# Patient Record
Sex: FEMALE | Race: OTHER | HISPANIC OR LATINO
[De-identification: names, ages, dates, MRNs, and addresses within clinical notes are randomized per-mention and may not be internally consistent; named-entity substitution may affect disease eponyms.]

---

## 2020-07-29 PROBLEM — Z00.00 ENCOUNTER FOR PREVENTIVE HEALTH EXAMINATION: Status: ACTIVE | Noted: 2020-07-29

## 2020-08-28 ENCOUNTER — APPOINTMENT (OUTPATIENT)
Dept: OBGYN | Facility: CLINIC | Age: 75
End: 2020-08-28

## 2020-10-26 ENCOUNTER — APPOINTMENT (OUTPATIENT)
Dept: UROLOGY | Facility: CLINIC | Age: 75
End: 2020-10-26
Payer: MEDICARE

## 2020-10-26 VITALS
WEIGHT: 150 LBS | HEIGHT: 55 IN | DIASTOLIC BLOOD PRESSURE: 69 MMHG | BODY MASS INDEX: 34.71 KG/M2 | TEMPERATURE: 97.2 F | SYSTOLIC BLOOD PRESSURE: 147 MMHG | HEART RATE: 86 BPM

## 2020-10-26 DIAGNOSIS — N95.2 POSTMENOPAUSAL ATROPHIC VAGINITIS: ICD-10-CM

## 2020-10-26 DIAGNOSIS — Z86.79 PERSONAL HISTORY OF OTHER DISEASES OF THE CIRCULATORY SYSTEM: ICD-10-CM

## 2020-10-26 PROCEDURE — 99204 OFFICE O/P NEW MOD 45 MIN: CPT

## 2020-10-26 RX ORDER — CLOTRIMAZOLE AND BETAMETHASONE DIPROPIONATE 10; .5 MG/G; MG/G
1-0.05 CREAM TOPICAL
Qty: 1 | Refills: 1 | Status: ACTIVE | COMMUNITY
Start: 2020-10-26 | End: 1900-01-01

## 2020-10-26 NOTE — REVIEW OF SYSTEMS
[see HPI] : see HPI [Urine Infection (bladder/kidney)] : bladder/kidney infection [Pain during urination] : pain during urination [Blood in urine that you can see] : blood visible in urine [Told you have blood in urine on a urine test] : told blood was present in a urine test [Wake up at night to urinate  How many times?  ___] : wakes up to urinate [unfilled] times during the night [Negative] : Heme/Lymph

## 2020-10-29 PROBLEM — Z86.79 HISTORY OF HYPERTENSION: Status: RESOLVED | Noted: 2020-10-26 | Resolved: 2020-10-29

## 2020-10-29 RX ORDER — AMLODIPINE BESYLATE 5 MG/1
5 TABLET ORAL
Qty: 90 | Refills: 0 | Status: ACTIVE | COMMUNITY
Start: 2020-07-06

## 2020-10-29 RX ORDER — DICLOFENAC SODIUM 10 MG/G
1 GEL TOPICAL
Qty: 500 | Refills: 0 | Status: COMPLETED | COMMUNITY
Start: 2020-10-11

## 2020-10-29 RX ORDER — ROSUVASTATIN CALCIUM 10 MG/1
10 TABLET, FILM COATED ORAL
Qty: 90 | Refills: 0 | Status: ACTIVE | COMMUNITY
Start: 2020-08-30

## 2020-10-29 RX ORDER — AMLODIPINE BESYLATE 2.5 MG/1
2.5 TABLET ORAL
Qty: 90 | Refills: 0 | Status: COMPLETED | COMMUNITY
Start: 2019-10-15

## 2020-10-29 RX ORDER — ESTRADIOL 0.1 MG/G
0.1 CREAM VAGINAL
Qty: 42 | Refills: 0 | Status: COMPLETED | COMMUNITY
Start: 2020-08-11

## 2020-10-29 RX ORDER — PREGABALIN 50 MG/1
50 CAPSULE ORAL
Qty: 30 | Refills: 0 | Status: COMPLETED | COMMUNITY
Start: 2020-09-16

## 2020-10-29 NOTE — ASSESSMENT
[FreeTextEntry1] : 74 yo F with vaginal itching, microhematuria\par \par - UA, culture\par - Try clotrimazole with betamethasone cream for vaginal atrophy\par - Discussed possible etiologies for microhematuria including benign (UTI, nephrolithiasis, cyst, BPH) vs malignancy (renal, ureteral and bladder). Discussed hematuria workup which includes CT urogram and cystoscopy. Discussed risk and benefits of cystoscopy.\par - Once microhematuria confirmed, will plan to proceed with workup

## 2020-10-29 NOTE — HISTORY OF PRESENT ILLNESS
[FreeTextEntry1] : 76 yo Monegasque speaking F presents with 6 month history of vaginal itching\par Noticed itching is exacerbated after urination\par no gross hematuria, no dysuria but was told of microhematuria by PCP\par Voiding 3/day, nocturia 4-5/night\par Drinks 3 bottles of water, 1 cup of coffee, 1 cup of tea at night\par constipation sometimes but usually normal\par 4 children, , LMP = 22 yrs ago\par no incontinence\par Tried estrace in August but made itching worse so stopped

## 2020-10-29 NOTE — PHYSICAL EXAM
[General Appearance - Well Developed] : well developed [General Appearance - Well Nourished] : well nourished [Normal Appearance] : normal appearance [Well Groomed] : well groomed [General Appearance - In No Acute Distress] : no acute distress [Edema] : no peripheral edema [Respiration, Rhythm And Depth] : normal respiratory rhythm and effort [Exaggerated Use Of Accessory Muscles For Inspiration] : no accessory muscle use [Abdomen Soft] : soft [Abdomen Tenderness] : non-tender [Costovertebral Angle Tenderness] : no ~M costovertebral angle tenderness [Urinary Bladder Findings] : the bladder was normal on palpation [Normal Station and Gait] : the gait and station were normal for the patient's age [] : no rash [No Focal Deficits] : no focal deficits [Oriented To Time, Place, And Person] : oriented to person, place, and time [Affect] : the affect was normal [Mood] : the mood was normal [Not Anxious] : not anxious [No Palpable Adenopathy] : no palpable adenopathy [Urethral Meatus] : normal urethra [External Female Genitalia] : normal external genitalia [FreeTextEntry1] : neg CST, no prolapse, vaginal atrophy

## 2020-11-17 LAB
APPEARANCE: CLEAR
BACTERIA UR CULT: NORMAL
BACTERIA: NEGATIVE
BILIRUBIN URINE: NEGATIVE
BLOOD URINE: NEGATIVE
COLOR: YELLOW
GLUCOSE QUALITATIVE U: NEGATIVE
HYALINE CASTS: 0 /LPF
KETONES URINE: NEGATIVE
LEUKOCYTE ESTERASE URINE: NEGATIVE
MICROSCOPIC-UA: NORMAL
NITRITE URINE: NEGATIVE
PH URINE: 6
PROTEIN URINE: NORMAL
RED BLOOD CELLS URINE: 3 /HPF
SPECIFIC GRAVITY URINE: 1.02
SQUAMOUS EPITHELIAL CELLS: 1 /HPF
UROBILINOGEN URINE: NORMAL
WHITE BLOOD CELLS URINE: 1 /HPF

## 2021-01-28 ENCOUNTER — APPOINTMENT (OUTPATIENT)
Age: 76
End: 2021-01-28
Payer: MEDICARE

## 2021-01-28 VITALS
WEIGHT: 147 LBS | HEART RATE: 82 BPM | TEMPERATURE: 97.1 F | HEIGHT: 55 IN | BODY MASS INDEX: 34.02 KG/M2 | OXYGEN SATURATION: 98 % | SYSTOLIC BLOOD PRESSURE: 168 MMHG | DIASTOLIC BLOOD PRESSURE: 68 MMHG

## 2021-01-28 PROCEDURE — 52000 CYSTOURETHROSCOPY: CPT

## 2021-01-28 PROCEDURE — 99072 ADDL SUPL MATRL&STAF TM PHE: CPT

## 2021-04-08 ENCOUNTER — APPOINTMENT (OUTPATIENT)
Dept: NEUROSURGERY | Facility: CLINIC | Age: 76
End: 2021-04-08

## 2021-04-09 ENCOUNTER — APPOINTMENT (OUTPATIENT)
Dept: NEUROSURGERY | Facility: CLINIC | Age: 76
End: 2021-04-09
Payer: MEDICARE

## 2021-04-09 VITALS
TEMPERATURE: 97.3 F | WEIGHT: 147 LBS | DIASTOLIC BLOOD PRESSURE: 72 MMHG | OXYGEN SATURATION: 99 % | HEIGHT: 55 IN | HEART RATE: 75 BPM | SYSTOLIC BLOOD PRESSURE: 138 MMHG | BODY MASS INDEX: 34.02 KG/M2

## 2021-04-09 DIAGNOSIS — E78.5 HYPERLIPIDEMIA, UNSPECIFIED: ICD-10-CM

## 2021-04-09 PROCEDURE — 99072 ADDL SUPL MATRL&STAF TM PHE: CPT

## 2021-04-09 PROCEDURE — 99203 OFFICE O/P NEW LOW 30 MIN: CPT

## 2021-04-12 NOTE — REASON FOR VISIT
[New Patient Visit] : a new patient visit [Family Member] : family member [FreeTextEntry1] : lower back pain

## 2021-04-12 NOTE — CONSULT LETTER
[Consult Letter:] : I had the pleasure of evaluating your patient, [unfilled]. [Please see my note below.] : Please see my note below. [Consult Closing:] : Thank you very much for allowing me to participate in the care of this patient.  If you have any questions, please do not hesitate to contact me. [Sincerely,] : Sincerely, [FreeTextEntry3] : LUTHER Phma

## 2021-04-12 NOTE — HISTORY OF PRESENT ILLNESS
[FreeTextEntry1] : lower erich kpain  [de-identified] : 75 years female here today for a neurosurgical evaluation of lower back pain. The patient present with 4 years of lower back pain with radiation to the right lower extremities.in which she managed conservatively with physical therapy,epidural injection and medications. She underwent surgery in 2018. After surgery she did well until recently she started  experiencing lower back pain with radiation to bilateral lower extremities intermittently. Pain is described aching and throbbing. Other associated symptoms include numbness, tingling and weakness. Pain is aggravated by walking, sitting, standing, exertional activities and bending.  For conservative management the patient has tried physical therapy and medications. The medication and physical therapy provide some relief of symptoms. No epidural injection after surgery. Denies urinary or bowel incontinence.

## 2021-04-12 NOTE — PHYSICAL EXAM
[General Appearance - Alert] : alert [General Appearance - In No Acute Distress] : in no acute distress [Oriented To Time, Place, And Person] : oriented to person, place, and time [Person] : oriented to person [Place] : oriented to place [Time] : oriented to time [Motor Tone] : muscle tone was normal in all four extremities [Motor Strength] : muscle strength was normal in all four extremities [Involuntary Movements] : no involuntary movements were seen [No Muscle Atrophy] : normal bulk in all four extremities [5] : S1 flexor hallucis longus 5/5 [Abnormal Walk] : normal gait [2+] : Ankle jerk left 2+ [Deformity] : no deformity [Erythema] : no erythema [Ecchymosis] : no ecchymosis [Swelling] : no swelling [Muscle Spasms, Bilateral] : bilateral muscle spasms [Restricted] : was restricted [Pain] : was painful [SLR] : positive Straight Leg Raise

## 2021-04-12 NOTE — ASSESSMENT
[FreeTextEntry1] : Tanya Ribeiro is a 75 years old female who presents to the office today for neurosurgical evaluation of lower back pain. The patient had a lumbar fusion approximately 2 years ago and did well after surgery. Recently she started to experience pain and started to managed the pain conservatively with use of NSAIDs and physical therapy but is not obtain complete relief of symptoms. Based on her clinical symptoms with failed conservative measure and past surgical history will order a new MRI of the lumbar spine to assess surgical site and rule out herniated disc and Xray to rule out instability of the spine. Once all imaging is complete patient to follow-up with Dr. Arguello. Plan of care and diagnostic testing has been discussed in details and all questions and concerns has been addressed and answered. I spent a total time of 30 minutes with the patient with more than half spent on counseling and coordinating care.

## 2021-05-26 PROBLEM — M54.16 LUMBAR RADICULOPATHY: Status: ACTIVE | Noted: 2021-04-09

## 2021-05-26 PROBLEM — M51.36 DISC DEGENERATION, LUMBAR: Status: ACTIVE | Noted: 2021-04-09

## 2021-05-27 ENCOUNTER — APPOINTMENT (OUTPATIENT)
Dept: NEUROSURGERY | Facility: CLINIC | Age: 76
End: 2021-05-27
Payer: MEDICARE

## 2021-05-27 VITALS
OXYGEN SATURATION: 97 % | BODY MASS INDEX: 34.02 KG/M2 | WEIGHT: 147 LBS | SYSTOLIC BLOOD PRESSURE: 145 MMHG | HEART RATE: 82 BPM | DIASTOLIC BLOOD PRESSURE: 74 MMHG | HEIGHT: 55 IN | TEMPERATURE: 97.3 F

## 2021-05-27 DIAGNOSIS — M51.36 OTHER INTERVERTEBRAL DISC DEGENERATION, LUMBAR REGION: ICD-10-CM

## 2021-05-27 DIAGNOSIS — M54.16 RADICULOPATHY, LUMBAR REGION: ICD-10-CM

## 2021-05-27 DIAGNOSIS — M54.9 DORSALGIA, UNSPECIFIED: ICD-10-CM

## 2021-05-27 PROCEDURE — 99213 OFFICE O/P EST LOW 20 MIN: CPT

## 2021-05-27 NOTE — HISTORY OF PRESENT ILLNESS
[FreeTextEntry1] : lower back pain  [de-identified] : 5/27/2021- The patient is here for follow-up of lower back pain and imaging. The patient states the pain has been stable since last visit states the pain has improved slightly. The pain remains intermittent with the lower back pain with radiation to bilateral lower extremities. The left lower extremities is the worst of the two. Other associated symptoms are numbness and tingling. She is using meloxicam for the pain. Denies incontinence and/or weakness. \par \par 4/9/2021 75 years female here today for a neurosurgical evaluation of lower back pain. The patient present with 4 years of lower back pain with radiation to the right lower extremities.in which she managed conservatively with physical therapy,epidural injection and medications. She underwent surgery in 2018. After surgery she did well until recently she started  experiencing lower back pain with radiation to bilateral lower extremities intermittently. Pain is described aching and throbbing. Other associated symptoms include numbness, tingling and weakness. Pain is aggravated by walking, sitting, standing, exertional activities and bending.  For conservative management the patient has tried physical therapy and medications. The medication and physical therapy provide some relief of symptoms. No epidural injection after surgery. Denies urinary or bowel incontinence.

## 2021-05-27 NOTE — ASSESSMENT
[FreeTextEntry1] : She has pain ant he MRI and CT shows adjacent level stenosis. She thinks she can tolerate the pain for now. I will have her do PT and get pain management for now.

## 2021-05-27 NOTE — PHYSICAL EXAM
[General Appearance - Alert] : alert [General Appearance - In No Acute Distress] : in no acute distress [Longitudinal] : longitudinal [Clean] : clean [Dry] : dry [Healing Well] : healing well [Well-Healed] : well-healed [Palpable Crepitus] : with palpable crepitus [Intact] : intact [No Drainage] : without drainage [Normal Skin] : normal [Normal Skin Turgor] : skin turgor was normal [Oriented To Time, Place, And Person] : oriented to person, place, and time [Motor Tone] : muscle tone was normal in all four extremities [Motor Strength] : muscle strength was normal in all four extremities [Involuntary Movements] : no involuntary movements were seen [No Muscle Atrophy] : normal bulk in all four extremities [Dysesthesia] : dysesthesia was present [Muscle Spasms, Bilateral] : bilateral muscle spasms [Restricted] : was restricted [Pain] : was painful [5] : 5/5 Ankle Plantar Flexion (S1) [2] : 2/4 Plantar Reflex [Tender] : not tender [Warm] : not warm [Indurated] : not indurated [FreeTextEntry1] : lumbar  [Deformity] : no deformity [Erythema] : no erythema [Ecchymosis] : no ecchymosis [Swelling] : no swelling [SLR] : negative Straight Leg Raise [Pain / Temp Decrease Inguinal Region (L1) - Right Side Only] : no L1 sensory impairment [Pain / Temp Decrease Upper Thigh (L2) - Right Only] : no L2 sensory impairment [Pain / Temp Decrease Lower Thigh (L3) - Right Only] : no L3 sensory impairment [Pain / Temp Decrease Knee And Medial Leg (L4) - Right Only] : no L4 sensory impairment [Pain / Temp Decrease Lateral Leg & Dorsum Of Foot Right Only] : no L5 sensory impairment [Pain / Temp Decrease Inguinal Region (L1) - Left Side Only] : no L1 sensory impairment [Pain / Temp Decrease Upper Thigh (L2) - Left Only] : no L2 sensory impairment [Pain / Temp Decrease Lower Thigh (L3) - Left Only] : no L3 sensory impairment [Pain / Temp Decrease Knee And Medial Leg (L4) - Left Only] : no L4 sensory impairment [Pain / Temp Decrease Lateral Leg & Dorsum Of Foot Left Only] : no L5 sensory impairment

## 2021-06-11 RX ORDER — IBUPROFEN 800 MG/1
800 TABLET, FILM COATED ORAL EVERY 8 HOURS
Qty: 90 | Refills: 2 | Status: ACTIVE | COMMUNITY
Start: 2021-06-11 | End: 1900-01-01

## 2023-05-15 ENCOUNTER — APPOINTMENT (OUTPATIENT)
Dept: GASTROENTEROLOGY | Facility: CLINIC | Age: 78
End: 2023-05-15
Payer: MEDICARE

## 2023-05-15 VITALS
HEIGHT: 55 IN | WEIGHT: 143 LBS | DIASTOLIC BLOOD PRESSURE: 76 MMHG | TEMPERATURE: 97.1 F | HEART RATE: 95 BPM | OXYGEN SATURATION: 98 % | SYSTOLIC BLOOD PRESSURE: 164 MMHG | BODY MASS INDEX: 33.09 KG/M2

## 2023-05-15 DIAGNOSIS — R10.13 EPIGASTRIC PAIN: ICD-10-CM

## 2023-05-15 DIAGNOSIS — Z12.11 ENCOUNTER FOR SCREENING FOR MALIGNANT NEOPLASM OF COLON: ICD-10-CM

## 2023-05-15 PROCEDURE — 99204 OFFICE O/P NEW MOD 45 MIN: CPT

## 2023-05-15 RX ORDER — VALSARTAN AND HYDROCHLOROTHIAZIDE 80; 12.5 MG/1; MG/1
80-12.5 TABLET, FILM COATED ORAL
Qty: 90 | Refills: 0 | Status: ACTIVE | COMMUNITY
Start: 2023-05-06

## 2023-05-15 RX ORDER — SIMETHICONE 125 MG/1
125 TABLET, CHEWABLE ORAL
Qty: 120 | Refills: 3 | Status: ACTIVE | COMMUNITY
Start: 2023-05-15 | End: 1900-01-01

## 2023-05-15 RX ORDER — ROSUVASTATIN CALCIUM 5 MG/1
5 TABLET, FILM COATED ORAL
Qty: 90 | Refills: 0 | Status: ACTIVE | COMMUNITY
Start: 2023-02-23

## 2023-05-15 RX ORDER — POLYETHYLENE GLYCOL 3350 AND ELECTROLYTES WITH LEMON FLAVOR 236; 22.74; 6.74; 5.86; 2.97 G/4L; G/4L; G/4L; G/4L; G/4L
236 POWDER, FOR SOLUTION ORAL
Qty: 1 | Refills: 0 | Status: ACTIVE | COMMUNITY
Start: 2023-05-15 | End: 1900-01-01

## 2023-05-15 NOTE — ASSESSMENT
[FreeTextEntry1] : Dyspepsia: The patient complains of dyspeptic symptoms.  The patient was advised to abide by an anti-gas (low FOD-MAP) diet.  The patient was given a pamphlet for anti-gas (low FOD-MAP).  The patient and I reviewed the anti-gas (low FOD-MAP) diet at length. The patient is to start on a trial of Simethicone one tablet 4 times a day p.r.n. abdominal pain and gas.\par Colon Cancer screening: I recommend colonoscopy for colon cancer screening over the age of 45 to assess for colonic polyps. The patient was told of the risks and benefits of the procedure.  The patient was told of the risks of perforation, emergency surgery, bleeding, infections and missed lesions. The patient is to be on a clear liquid diet the entire day prior to the procedure. The patient is to complete the entire prescribed bowel prep the day prior to the procedure as directed.  The patient is told not to drive, drink alcohol, use recreational drugs, exercise, or work the day of the procedure.  The patient was told of the need for an escort to accompany the patient home after the procedure. The patient is aware that the procedure may be cancelled if they fail to follow the directions.  The patient agreed and will schedule for the procedure. The patient can take the antihypertensive medication with a sip of water one hour prior to the procedure. The patient is to hold the diabetic medication the day before and the morning of the procedure. The patient is to be n.p.o. after midnight and bowel prep was given.  The patient is to return for the procedure.\par Colonoscopy: I recommend a colonoscopy to assess the symptoms and for colonic polyps.  The patient was told of the risks and benefits of the procedure.  The patient was told of the risks of perforation, emergency surgery, bleeding, infections and missed lesions.  The patient is to be on a clear liquid diet the entire day prior to the procedure. The patient is to complete the entire prescribed bowel prep the day prior to the procedure as directed.   The patient is told not to drive, drink alcohol, use recreational drugs, exercise, or work the day of the procedure.  The patient was told of the need for an escort to accompany the patient home after the procedure. The patient is aware that the procedure may be cancelled if they fail to follow the directions.  The patient agreed and will schedule for the procedure. The patient can take the antihypertensive medication with a sip of water one hour prior to the procedure.  The patient is to be n.p.o. after midnight and bowel prep was given.  The patient is to return for the procedure.\par Follow-up: The patient is to follow-up in the office in 4 weeks to reassess the symptoms. The patient was told to call the office if any further problems. \par

## 2023-05-15 NOTE — HISTORY OF PRESENT ILLNESS
[FreeTextEntry1] : The colonoscopy to the cecum performed at the office on February 27, 2015 revealed a very poor prep colonoscopy and small internal hemorrhoids. There were no polyps, masses, diverticulosis, AVMs or colitis noted.  [de-identified] : The CAT scan of the abdomen and pelvis with urogram and renal protocol performed on January 14, 2021 revealed no structural cause for hematuria and kidneys, collecting system or urinary bladder.  Also noted were circumferential fusion hardware noted at L4-S1, grade 1 anterolisthesis at L4-5 and L5-S1, minimal sigmoid diverticulosis without diverticulitis, benign bilobed cyst in the right lobe of the liver measuring up to 4.1 cm and status postcholecystectomy.

## 2023-05-15 NOTE — REVIEW OF SYSTEMS
[Bloating (gassiness)] : bloating [Arthralgias (joint pain)] : arthralgias [Hot Flashes] : hot flashes [Easy Bruising] : a tendency for easy bruising [Negative] : Heme/Lymph [FreeTextEntry3] : blurred vision [FreeTextEntry8] : nocturia, dysuria

## 2023-08-03 ENCOUNTER — RESULT REVIEW (OUTPATIENT)
Age: 78
End: 2023-08-03

## 2023-08-03 ENCOUNTER — APPOINTMENT (OUTPATIENT)
Dept: GASTROENTEROLOGY | Facility: HOSPITAL | Age: 78
End: 2023-08-03

## 2023-08-03 ENCOUNTER — OUTPATIENT (OUTPATIENT)
Dept: OUTPATIENT SERVICES | Facility: HOSPITAL | Age: 78
LOS: 1 days | End: 2023-08-03
Payer: COMMERCIAL

## 2023-08-03 ENCOUNTER — TRANSCRIPTION ENCOUNTER (OUTPATIENT)
Age: 78
End: 2023-08-03

## 2023-08-03 VITALS
DIASTOLIC BLOOD PRESSURE: 90 MMHG | HEIGHT: 55 IN | TEMPERATURE: 98 F | OXYGEN SATURATION: 96 % | HEART RATE: 76 BPM | SYSTOLIC BLOOD PRESSURE: 128 MMHG | WEIGHT: 143.08 LBS | RESPIRATION RATE: 16 BRPM

## 2023-08-03 VITALS
OXYGEN SATURATION: 98 % | RESPIRATION RATE: 16 BRPM | SYSTOLIC BLOOD PRESSURE: 123 MMHG | DIASTOLIC BLOOD PRESSURE: 68 MMHG | HEART RATE: 60 BPM

## 2023-08-03 DIAGNOSIS — Z90.49 ACQUIRED ABSENCE OF OTHER SPECIFIED PARTS OF DIGESTIVE TRACT: Chronic | ICD-10-CM

## 2023-08-03 DIAGNOSIS — Z12.4 ENCOUNTER FOR SCREENING FOR MALIGNANT NEOPLASM OF CERVIX: ICD-10-CM

## 2023-08-03 DIAGNOSIS — Z98.1 ARTHRODESIS STATUS: Chronic | ICD-10-CM

## 2023-08-03 DIAGNOSIS — Z98.51 TUBAL LIGATION STATUS: Chronic | ICD-10-CM

## 2023-08-03 PROCEDURE — 88305 TISSUE EXAM BY PATHOLOGIST: CPT | Mod: 26

## 2023-08-03 PROCEDURE — 45380 COLONOSCOPY AND BIOPSY: CPT | Mod: PT

## 2023-08-03 PROCEDURE — 45385 COLONOSCOPY W/LESION REMOVAL: CPT

## 2023-08-03 PROCEDURE — 88305 TISSUE EXAM BY PATHOLOGIST: CPT

## 2023-08-03 RX ORDER — SODIUM CHLORIDE 9 MG/ML
500 INJECTION INTRAMUSCULAR; INTRAVENOUS; SUBCUTANEOUS
Refills: 0 | Status: DISCONTINUED | OUTPATIENT
Start: 2023-08-03 | End: 2023-08-17

## 2023-08-03 NOTE — ASU PATIENT PROFILE, ADULT - NSICDXPASTSURGICALHX_GEN_ALL_CORE_FT
PAST SURGICAL HISTORY:  H/O spinal fusion     H/O tubal ligation     History of cholecystectomy

## 2023-08-03 NOTE — CHART NOTE - NSCHARTNOTEFT_GEN_A_CORE
History of Present Illness       The patient is a 77-year-old  female with past medical history significant for hypertension, hypercholesterolemia and diabetes mellitus who was referred to my office by Dr. Neto Norwood for dyspepsia. The patient also came to the office for evaluation for colon cancer screening. I was asked to render an opinion for consultation for the above complaints. The patient states that she is feeling fine. The patient denies any abdominal pain. The patient complains of abdominal gas and bloating. The patient denies any nausea or vomiting. The patient denies any gastroesophageal reflux disease or dysphagia. The patient denies any atypical chest pain, shortness of breath or palpitations. The patient admits to occasional episodes of diaphoresis. The patient denies any constipation or diarrhea. The patient has 1 bowel movement a day. The patient denies a change in bowel habits. The patient denies a change in caliber of stool. The patient denies having mucus discharge with the bowel movements. The patient denies any bright red blood per rectum, melena or hematemesis. The patient denies any rectal pain or rectal pruritus. The patient denies any weight loss or anorexia. She denies any fevers or chills. The patient denies any jaundice or pruritus. The patient complains of chronic lower back pain. The patient had a prior cholecystectomy. The patient denies having a recent upper endoscopy and colonoscopy performed by another gastroenterologist. The patient's last menstrual period was age 51. patient is a . The patient's first menstrual period was at age 16. The patient had a colonoscopy to the cecum performed at the office on 2015. The colonoscopic findings revealed a very poor prep colonoscopy and small internal hemorrhoids. There were no polyps, masses, diverticulosis, AVMs or colitis noted. The patient tolerated the procedure well. The CAT scan of the abdomen and pelvis with urogram and renal protocol performed on 2021 revealed no structural cause for hematuria and kidneys, collecting system or urinary bladder. Also noted were circumferential fusion hardware noted at L4-S1, grade 1 anterolisthesis at L4-5 and L5-S1, minimal sigmoid diverticulosis without diverticulitis, benign bilobed cyst in the right lobe of the liver measuring up to 4.1 cm and status postcholecystectomy.The patient admits to having a prior colonoscopy performed by another gastroenterologist. According to the patient, the colonoscopic findings revealed a normal study. The patient denies any significant family history of GI problems.  ?  ?  ?  (-) smoking, (-) ETOH, (-) IVDA  Gastroenterology Summary    Colonoscopy: The colonoscopy to the cecum performed at the office on 2015 revealed a very poor prep colonoscopy and small internal hemorrhoids. There were no polyps, masses, diverticulosis, AVMs or colitis noted.  Radiology Summary    CT: The CAT scan of the abdomen and pelvis with urogram and renal protocol performed on 2021 revealed no structural cause for hematuria and kidneys, collecting system or urinary bladder. Also noted were circumferential fusion hardware noted at L4-S1, grade 1 anterolisthesis at L4-5 and L5-S1, minimal sigmoid diverticulosis without diverticulitis, benign bilobed cyst in the right lobe of the liver measuring up to 4.1 cm and status postcholecystectomy.  ?  Active Problems  Disc degeneration, lumbar (722.52) (M51.36)  Lumbar radiculopathy (724.4) (M54.16)  Microhematuria (599.72) (R31.29)  Pain in back (724.5) (M54.9)  Vaginal atrophy (627.3) (N95.2)       ?  Past Medical History  History of hypertension (V12.59) (Z86.79)  History of Hyperlipidemia (272.4) (E78.5)       ?  Surgical History  History of Cholecystectomy  History of Lower back surgery  History of Posterior lumbar vertebral fusion       ?  Family History  Family history of Cancer  Family history of Diabetes       ?  Social History  Has 4 children    Never a smoker  No alcohol use  Retired from employment       ?  Current Meds  amLODIPine Besylate 5 MG Oral Tablet; TOME LIDIA TABLETA POR V A ORAL AT NIGHT  Clotrimazole-Betamethasone 1-0.05 % External Cream; APPLY  AND RUB  IN A THIN  FILM TO AFFECTED AREAS ONCE DAILY  Ibuprofen 800 MG Oral Tablet; TAKE 1 TABLET EVERY 8 HOURS AS NEEDED  Rosuvastatin Calcium 10 MG Oral Tablet; TOME LIDIA TABLETA TODOS LOS D AS  Rosuvastatin Calcium 5 MG Oral Tablet  Valsartan-hydroCHLOROthiazide 80-12.5 MG Oral Tablet       Allergies  No Known Drug Allergies       ?  Review of Systems          Eyes:. blurred vision.    Gastrointestinal: bloating.    Genitourinary:. nocturia, dysuria.    Musculoskeletal: arthralgias.    Endocrine: hot flashes.    Hematologic/Lymphatic: a tendency for easy bruising.    Constitutional, Eyes, ENT, Cardiovascular, Respiratory, Gastrointestinal, Genitourinary, Musculoskeletal, Integumentary, Neurological, Psychiatric, Endocrine and Heme/Lymph are otherwise negative.  ?  Vitals  Vital Signs  ?  Recorded: [id number] 09:15AM  Systolic  164, RUE, Sitting  Diastolic  76, RUE, Sitting  Height  4 ft 5 in  Weight  143 lb  BMI Calculated  35.79 kg/m2  BSA Calculated  1.48  Temperature  97.1 F, Temporal  Heart Rate  95  O2 Saturation  98       ?  Physical Exam   Physical Exam:  General Appearance: Well developed, overweight, no acute distress  ENT:  nose clear, ears unremarkable  Eyes: No enteric sclera, conjunctiva clear.  Neck: Supple, without masses  Respiratory: Breath sounds equal and bilateral, no wheezing no rales or rhonchi  Cardiovascular: S1-S2 audible, no murmur, no rubs or gallops  GI: (+) BS, soft, nontender, no rebound, no guarding, no masses  Liver: liver edge palpated  Rectal: not done  Musculo-skeletal: Good motor strength, good range of motion, normal appearing extremities  Skin: Normal appearing skin, no jaundice, no rashes or nodules  Neurological: without focal motor or sensory deficits Patient is moving all extremities spontaneously and to command with normal muscle strength alert and oriented X3  Psychiatric: Good affect, not depressed, not anxious  ?     ?  ?  Assessment  Dyspepsia (536.8) (R10.13)  Colon cancer screening (V76.51) (Z12.11)  Dyspepsia: The patient complains of dyspeptic symptoms. The patient was advised to abide by an anti-gas (low FOD-MAP) diet. The patient was given a pamphlet for anti-gas (low FOD-MAP). The patient and I reviewed the anti-gas (low FOD-MAP) diet at length. The patient is to start on a trial of Simethicone one tablet 4 times a day p.r.n. abdominal pain and gas.  ?  Colon Cancer screening: I recommend colonoscopy for colon cancer screening over the age of 45 to assess for colonic polyps. The patient was told of the risks and benefits of the procedure. The patient was told of the risks of perforation, emergency surgery, bleeding, infections and missed lesions. The patient is to be on a clear liquid diet the entire day prior to the procedure. The patient is to complete the entire prescribed bowel prep the day prior to the procedure as directed. The patient is told not to drive, drink alcohol, use recreational drugs, exercise, or work the day of the procedure. The patient was told of the need for an escort to accompany the patient home after the procedure. The patient is aware that the procedure may be cancelled if they fail to follow the directions. The patient agreed and will schedule for the procedure. The patient can take the antihypertensive medication with a sip of water one hour prior to the procedure. The patient is to hold the diabetic medication the day before and the morning of the procedure. The patient is to be n.p.o. after midnight and bowel prep was given. The patient is to return for the procedure.  ?  Colonoscopy: I recommend a colonoscopy to assess the symptoms and for colonic polyps. The patient was told of the risks and benefits of the procedure. The patient was told of the risks of perforation, emergency surgery, bleeding, infections and missed lesions. The patient is to be on a clear liquid diet the entire day prior to the procedure. The patient is to complete the entire prescribed bowel prep the day prior to the procedure as directed. The patient is told not to drive, drink alcohol, use recreational drugs, exercise, or work the day of the procedure. The patient was told of the need for an escort to accompany the patient home after the procedure. The patient is aware that the procedure may be cancelled if they fail to follow the directions. The patient agreed and will schedule for the procedure. The patient can take the antihypertensive medication with a sip of water one hour prior to the procedure. The patient is to be n.p.o. after midnight and bowel prep was given. The patient is to return for the procedure.  ?  Follow-up: The patient is to follow-up in the office in 4 weeks to reassess the symptoms. The patient was told to call the office if any further problems.             ?  Plan  Colon cancer screening  Start: PEG-3350/Electrolytes 236 GM Oral Solution Reconstituted; MIX AS DIRECTED  AND DRINK OVER 4 HOURS, START AT 4PM  Colonoscopy Screening; Status:Need Information - Financial Authorization; Requested  for:[id number];  Dyspepsia  Start: Simethicone 125 MG Oral Tablet Chewable; CHEW AND SWALLOW 1 TABLET 4  TIMES DAILY, AFTER MEALS AND AT BEDTIME

## 2023-08-03 NOTE — ASU PATIENT PROFILE, ADULT - FALL HARM RISK - UNIVERSAL INTERVENTIONS
Bed in lowest position, wheels locked, appropriate side rails in place/Call bell, personal items and telephone in reach/Instruct patient to call for assistance before getting out of bed or chair/Non-slip footwear when patient is out of bed/[place] to call system/Physically safe environment - no spills, clutter or unnecessary equipment/Purposeful Proactive Rounding/Room/bathroom lighting operational, light cord in reach

## 2023-08-03 NOTE — ASU PATIENT PROFILE, ADULT - REASON FOR ADMISSION, PROFILE
Addended by: Skylar Tiwari on: 3/20/2023 03:53 PM     Modules accepted: Orders screening colonoscopy

## 2023-08-07 LAB — SURGICAL PATHOLOGY STUDY: SIGNIFICANT CHANGE UP

## 2024-02-26 ENCOUNTER — APPOINTMENT (OUTPATIENT)
Dept: UROLOGY | Facility: CLINIC | Age: 79
End: 2024-02-26
Payer: MEDICARE

## 2024-02-26 VITALS
WEIGHT: 140 LBS | HEIGHT: 55 IN | TEMPERATURE: 97.8 F | BODY MASS INDEX: 32.4 KG/M2 | HEART RATE: 80 BPM | OXYGEN SATURATION: 98 % | SYSTOLIC BLOOD PRESSURE: 125 MMHG | DIASTOLIC BLOOD PRESSURE: 64 MMHG

## 2024-02-26 PROBLEM — I10 ESSENTIAL (PRIMARY) HYPERTENSION: Chronic | Status: ACTIVE | Noted: 2023-08-03

## 2024-02-26 PROBLEM — E78.5 HYPERLIPIDEMIA, UNSPECIFIED: Chronic | Status: ACTIVE | Noted: 2023-08-03

## 2024-02-26 PROCEDURE — 99203 OFFICE O/P NEW LOW 30 MIN: CPT

## 2024-03-03 NOTE — HISTORY OF PRESENT ILLNESS
[FreeTextEntry1] : 76 yo Welsh speaking F presents with 6 month history of vaginal itching Noticed itching is exacerbated after urination no gross hematuria, no dysuria but was told of microhematuria by PCP Voiding 3/day, nocturia 4-5/night Drinks 3 bottles of water, 1 cup of coffee, 1 cup of tea at night constipation sometimes but usually normal 4 children, , LMP = 22 yrs ago no incontinence Tried estrace in August but made itching worse so stopped  24 Interval history: Hasn't been seen since 2021 Referred for persistent microhematuria no dysuria no gross hematuria no UTI history 6-10 RBCs on UA in 24 [facility] - everything fine normal bowel movements

## 2024-03-03 NOTE — ASSESSMENT
[FreeTextEntry1] : 79 yo F with microhematuria  - Reviewed labwork done by PCP and confirmed persistent microhematuria - Discussed possible etiologies for microhematuria including benign (UTI, nephrolithiasis, cyst, BPH) vs malignancy (renal, ureteral and bladder). Discussed hematuria workup which includes upper tract imaging such as US or CT urogram and cystoscopy. Discussed risk and benefits of cystoscopy. - Schedule US and cysto - Urine culture

## 2024-03-04 ENCOUNTER — APPOINTMENT (OUTPATIENT)
Dept: UROLOGY | Facility: CLINIC | Age: 79
End: 2024-03-04
Payer: MEDICARE

## 2024-03-04 VITALS
BODY MASS INDEX: 32.4 KG/M2 | HEIGHT: 55 IN | TEMPERATURE: 97.3 F | DIASTOLIC BLOOD PRESSURE: 63 MMHG | OXYGEN SATURATION: 95 % | WEIGHT: 140 LBS | SYSTOLIC BLOOD PRESSURE: 123 MMHG | HEART RATE: 75 BPM

## 2024-03-04 DIAGNOSIS — R31.29 OTHER MICROSCOPIC HEMATURIA: ICD-10-CM

## 2024-03-04 LAB — BACTERIA UR CULT: NORMAL

## 2024-03-04 PROCEDURE — 76775 US EXAM ABDO BACK WALL LIM: CPT

## 2024-03-04 PROCEDURE — 52000 CYSTOURETHROSCOPY: CPT

## 2024-03-04 RX ORDER — ESTRADIOL 0.1 MG/G
0.1 CREAM VAGINAL
Qty: 1 | Refills: 6 | Status: ACTIVE | COMMUNITY
Start: 2024-03-04 | End: 1900-01-01

## 2024-03-07 NOTE — ASU PREOP CHECKLIST - VERIFY SURGICAL SITE/SIDE WITH PATIENT
No care due was identified.  [facility] Embedded Care Due Messages. Reference number: [id number].   3/07/2024 7:54:44 AM CST   done

## (undated) DEVICE — SOL INJ NS 0.9% 500ML 1-PORT

## (undated) DEVICE — CATH ELCTR GLIDE PRB 7FR

## (undated) DEVICE — RETRIEVER ROTH NET PLATINUM-UNIVERSAL

## (undated) DEVICE — DRSG CURITY GAUZE SPONGE 4 X 4" 12-PLY

## (undated) DEVICE — KIT ENDO PROCEDURE CUST W/VLV

## (undated) DEVICE — LUBRICATING JELLY ONESHOT 1.25OZ

## (undated) DEVICE — TUBING CANNULA SALTER LABS NASAL ADULT 7FT

## (undated) DEVICE — NDL INJ SCLERO INTERJECT 23G

## (undated) DEVICE — FORCEP BIOPSY 2.5MM DISP

## (undated) DEVICE — CLAMP BX HOT RAD JAW 3

## (undated) DEVICE — SYR LUER LOK 50CC

## (undated) DEVICE — ADAPTER ENDO CHNL SINGLE USE

## (undated) DEVICE — SNARE LOOP POLY DISP 30MM LOOP

## (undated) DEVICE — SENSOR O2 FINGER ADULT

## (undated) DEVICE — TUBING IV SET GRAVITY 3Y 100" MACRO

## (undated) DEVICE — STERIS DEFENDO 3-PIECE KIT (AIR/WATER, SUCTION & BIOPSY VALVES)

## (undated) DEVICE — TUBING MEDI-VAC W MAXIGRIP CONNECTORS 1/4"X6'